# Patient Record
Sex: MALE | Race: BLACK OR AFRICAN AMERICAN | URBAN - METROPOLITAN AREA
[De-identification: names, ages, dates, MRNs, and addresses within clinical notes are randomized per-mention and may not be internally consistent; named-entity substitution may affect disease eponyms.]

---

## 2023-03-16 ENCOUNTER — APPOINTMENT (OUTPATIENT)
Dept: GENERAL RADIOLOGY | Age: 33
End: 2023-03-16
Payer: COMMERCIAL

## 2023-03-16 ENCOUNTER — HOSPITAL ENCOUNTER (EMERGENCY)
Age: 33
Discharge: HOME | End: 2023-03-16
Attending: STUDENT IN AN ORGANIZED HEALTH CARE EDUCATION/TRAINING PROGRAM
Payer: COMMERCIAL

## 2023-03-16 ENCOUNTER — APPOINTMENT (OUTPATIENT)
Dept: CT IMAGING | Age: 33
End: 2023-03-16
Payer: COMMERCIAL

## 2023-03-16 VITALS
HEIGHT: 71 IN | DIASTOLIC BLOOD PRESSURE: 111 MMHG | OXYGEN SATURATION: 97 % | RESPIRATION RATE: 25 BRPM | BODY MASS INDEX: 44.1 KG/M2 | SYSTOLIC BLOOD PRESSURE: 155 MMHG | WEIGHT: 315 LBS | HEART RATE: 73 BPM | TEMPERATURE: 98.2 F

## 2023-03-16 DIAGNOSIS — R10.11 ABDOMINAL PAIN, RIGHT UPPER QUADRANT: Primary | ICD-10-CM

## 2023-03-16 DIAGNOSIS — M54.6 PAIN IN THORACIC SPINE: ICD-10-CM

## 2023-03-16 LAB
ALBUMIN SERPL-MCNC: 4.3 G/DL (ref 3.4–5)
ALP SERPL-CCNC: 76 U/L (ref 40–129)
ALT SERPL-CCNC: 20 U/L (ref 10–40)
ANION GAP SERPL CALCULATED.3IONS-SCNC: 11 MMOL/L (ref 3–16)
AST SERPL-CCNC: 21 U/L (ref 15–37)
BASOPHILS # BLD: 0.1 K/UL (ref 0–0.2)
BASOPHILS NFR BLD: 1.1 %
BILIRUB DIRECT SERPL-MCNC: <0.2 MG/DL (ref 0–0.3)
BILIRUB INDIRECT SERPL-MCNC: NORMAL MG/DL (ref 0–1)
BILIRUB SERPL-MCNC: 0.3 MG/DL (ref 0–1)
BILIRUB UR QL STRIP.AUTO: NEGATIVE
BUN SERPL-MCNC: 23 MG/DL (ref 7–20)
CALCIUM SERPL-MCNC: 9.3 MG/DL (ref 8.3–10.6)
CHLORIDE SERPL-SCNC: 102 MMOL/L (ref 99–110)
CLARITY UR: CLEAR
CO2 SERPL-SCNC: 30 MMOL/L (ref 21–32)
COLOR UR: YELLOW
CREAT SERPL-MCNC: 1 MG/DL (ref 0.9–1.3)
D DIMER: 0.51 UG/ML FEU (ref 0–0.6)
DEPRECATED RDW RBC AUTO: 15 % (ref 12.4–15.4)
EKG ATRIAL RATE: 71 BPM
EKG DIAGNOSIS: NORMAL
EKG P AXIS: 33 DEGREES
EKG P-R INTERVAL: 168 MS
EKG Q-T INTERVAL: 390 MS
EKG QRS DURATION: 100 MS
EKG QTC CALCULATION (BAZETT): 423 MS
EKG R AXIS: -18 DEGREES
EKG T AXIS: 78 DEGREES
EKG VENTRICULAR RATE: 71 BPM
EOSINOPHIL # BLD: 0.1 K/UL (ref 0–0.6)
EOSINOPHIL NFR BLD: 1.2 %
GFR SERPLBLD CREATININE-BSD FMLA CKD-EPI: >60 ML/MIN/{1.73_M2}
GLUCOSE SERPL-MCNC: 106 MG/DL (ref 70–99)
GLUCOSE UR STRIP.AUTO-MCNC: NEGATIVE MG/DL
HCT VFR BLD AUTO: 39.1 % (ref 40.5–52.5)
HGB BLD-MCNC: 13.1 G/DL (ref 13.5–17.5)
HGB UR QL STRIP.AUTO: NEGATIVE
KETONES UR STRIP.AUTO-MCNC: NEGATIVE MG/DL
LEUKOCYTE ESTERASE UR QL STRIP.AUTO: NEGATIVE
LIPASE SERPL-CCNC: 97 U/L (ref 13–60)
LYMPHOCYTES # BLD: 1.9 K/UL (ref 1–5.1)
LYMPHOCYTES NFR BLD: 34.2 %
MAGNESIUM SERPL-MCNC: 1.9 MG/DL (ref 1.8–2.4)
MCH RBC QN AUTO: 29.9 PG (ref 26–34)
MCHC RBC AUTO-ENTMCNC: 33.5 G/DL (ref 31–36)
MCV RBC AUTO: 89.2 FL (ref 80–100)
MONOCYTES # BLD: 0.5 K/UL (ref 0–1.3)
MONOCYTES NFR BLD: 8.6 %
NEUTROPHILS # BLD: 3.1 K/UL (ref 1.7–7.7)
NEUTROPHILS NFR BLD: 54.9 %
NITRITE UR QL STRIP.AUTO: NEGATIVE
PH UR STRIP.AUTO: 7 [PH] (ref 5–8)
PLATELET # BLD AUTO: 223 K/UL (ref 135–450)
PMV BLD AUTO: 9.6 FL (ref 5–10.5)
POTASSIUM SERPL-SCNC: 3.2 MMOL/L (ref 3.5–5.1)
PROT SERPL-MCNC: 7.1 G/DL (ref 6.4–8.2)
PROT UR STRIP.AUTO-MCNC: ABNORMAL MG/DL
RBC # BLD AUTO: 4.39 M/UL (ref 4.2–5.9)
RBC #/AREA URNS HPF: NORMAL /HPF (ref 0–4)
SODIUM SERPL-SCNC: 143 MMOL/L (ref 136–145)
SP GR UR STRIP.AUTO: 1.02 (ref 1–1.03)
TROPONIN T SERPL-MCNC: <0.01 NG/ML
UA COMPLETE W REFLEX CULTURE PNL UR: ABNORMAL
UA DIPSTICK W REFLEX MICRO PNL UR: YES
URN SPEC COLLECT METH UR: ABNORMAL
UROBILINOGEN UR STRIP-ACNC: 0.2 E.U./DL
WBC # BLD AUTO: 5.7 K/UL (ref 4–11)
WBC #/AREA URNS HPF: NORMAL /HPF (ref 0–5)

## 2023-03-16 PROCEDURE — 85025 COMPLETE CBC W/AUTO DIFF WBC: CPT

## 2023-03-16 PROCEDURE — 83690 ASSAY OF LIPASE: CPT

## 2023-03-16 PROCEDURE — 71045 X-RAY EXAM CHEST 1 VIEW: CPT

## 2023-03-16 PROCEDURE — 71275 CT ANGIOGRAPHY CHEST: CPT

## 2023-03-16 PROCEDURE — 80076 HEPATIC FUNCTION PANEL: CPT

## 2023-03-16 PROCEDURE — 6370000000 HC RX 637 (ALT 250 FOR IP): Performed by: STUDENT IN AN ORGANIZED HEALTH CARE EDUCATION/TRAINING PROGRAM

## 2023-03-16 PROCEDURE — 84484 ASSAY OF TROPONIN QUANT: CPT

## 2023-03-16 PROCEDURE — 80048 BASIC METABOLIC PNL TOTAL CA: CPT

## 2023-03-16 PROCEDURE — 85379 FIBRIN DEGRADATION QUANT: CPT

## 2023-03-16 PROCEDURE — 83735 ASSAY OF MAGNESIUM: CPT

## 2023-03-16 PROCEDURE — 6360000004 HC RX CONTRAST MEDICATION: Performed by: STUDENT IN AN ORGANIZED HEALTH CARE EDUCATION/TRAINING PROGRAM

## 2023-03-16 PROCEDURE — 74177 CT ABD & PELVIS W/CONTRAST: CPT

## 2023-03-16 PROCEDURE — 99285 EMERGENCY DEPT VISIT HI MDM: CPT

## 2023-03-16 PROCEDURE — 81001 URINALYSIS AUTO W/SCOPE: CPT

## 2023-03-16 PROCEDURE — 93005 ELECTROCARDIOGRAM TRACING: CPT | Performed by: STUDENT IN AN ORGANIZED HEALTH CARE EDUCATION/TRAINING PROGRAM

## 2023-03-16 RX ORDER — METHOCARBAMOL 500 MG/1
750 TABLET, FILM COATED ORAL 4 TIMES DAILY
Status: DISCONTINUED | OUTPATIENT
Start: 2023-03-16 | End: 2023-03-16 | Stop reason: HOSPADM

## 2023-03-16 RX ORDER — LIDOCAINE 4 G/G
1 PATCH TOPICAL DAILY
Status: DISCONTINUED | OUTPATIENT
Start: 2023-03-16 | End: 2023-03-16 | Stop reason: HOSPADM

## 2023-03-16 RX ORDER — METHOCARBAMOL 500 MG/1
500 TABLET, FILM COATED ORAL EVERY 8 HOURS PRN
Qty: 15 TABLET | Refills: 0 | Status: SHIPPED | OUTPATIENT
Start: 2023-03-16 | End: 2023-03-21

## 2023-03-16 RX ORDER — IBUPROFEN 400 MG/1
400 TABLET ORAL ONCE
Status: COMPLETED | OUTPATIENT
Start: 2023-03-16 | End: 2023-03-16

## 2023-03-16 RX ORDER — LIDOCAINE 4 G/G
1 PATCH TOPICAL EVERY 24 HOURS
Qty: 30 PATCH | Refills: 0 | Status: SHIPPED | OUTPATIENT
Start: 2023-03-16 | End: 2023-04-15

## 2023-03-16 RX ADMIN — IOPAMIDOL 75 ML: 755 INJECTION, SOLUTION INTRAVENOUS at 10:44

## 2023-03-16 RX ADMIN — METHOCARBAMOL 750 MG: 500 TABLET ORAL at 12:50

## 2023-03-16 RX ADMIN — IBUPROFEN 400 MG: 400 TABLET ORAL at 12:49

## 2023-03-16 ASSESSMENT — PAIN DESCRIPTION - PAIN TYPE: TYPE: ACUTE PAIN

## 2023-03-16 ASSESSMENT — PAIN - FUNCTIONAL ASSESSMENT: PAIN_FUNCTIONAL_ASSESSMENT: 0-10

## 2023-03-16 ASSESSMENT — PAIN SCALES - GENERAL
PAINLEVEL_OUTOF10: 7
PAINLEVEL_OUTOF10: 7

## 2023-03-16 ASSESSMENT — PAIN DESCRIPTION - ORIENTATION: ORIENTATION: MID;RIGHT

## 2023-03-16 ASSESSMENT — PAIN DESCRIPTION - FREQUENCY: FREQUENCY: CONTINUOUS

## 2023-03-16 ASSESSMENT — PAIN DESCRIPTION - DESCRIPTORS: DESCRIPTORS: CRAMPING;PRESSURE

## 2023-03-16 ASSESSMENT — PAIN DESCRIPTION - LOCATION: LOCATION: ABDOMEN;FLANK

## 2023-03-16 ASSESSMENT — PAIN DESCRIPTION - ONSET: ONSET: GRADUAL

## 2023-03-16 NOTE — ED PROVIDER NOTES
[address] ENCOUNTER          ATTENDING PHYSICIAN NOTE       Date of evaluation: 3/16/2023    Chief Complaint     Abdominal Pain and Flank Pain (C/o right flank pain and mid upper abd pain started 2 days ago getting worse )      History of Present Illness     Kevin Sanford is a 26 y.o. male who presents with RUQ/R posterior thorax pain    Patient reports that beginning last night he had right paraspinal back pain or flank pain. Pt denies a prolonged period of steroid use, personal history of cancer, or IV drug use. They also deny any trauma. They deny any numbness, weakness, or paresthesias, as well as deny any urinary retention, urinary incontinence, stool incontinence, or saddle anesthesia. This was mild to moderate in severity and aching in quality. It was optically worse with movement. This morning began having right upper quadrant epigastric pain. This was moderate to severe in severity, waxing and waning in course, and sharper in quality. It is worse with palpation. Is not optically worse with movement. Because of the persistent back pain and the presence of the new epigastric and right upper quadrant abdominal pain he did present for evaluation today. He has not had any recent fevers, nausea, vomiting. He has no history of blood clots, DVT or PE. He denies any history of leg pain or swelling recently. He does however live in a [place] and travel extended distances with some regularity. He has no significant shortness of breath or worsening of the pain with deep breathing. He has had normal bowel movements recently, including last night. Specifically he denies any blood, or melena. He has had no urinary symptoms recently. He has no history of prior abdominal surgery.     PMHx: Obesity, hypertension  SH: Lives in a [place], non-smoker, does occasionally use alcohol      ASSESSMENT / PLAN  (MEDICAL DECISION MAKING)     INITIAL VITALS: BP: (!) 177/117, Temp: 98.2 °F (36.8 °C), Heart Rate: 81, Resp: 18, SpO2: 99 %      Anny Sosa is a 26 y.o. male with 2 areas of pain: 1 in the posterior thoracic region on the right and 1 in the right upper quadrant epigastric region anteriorly. Given location of the pain I did consider the possibility pulmonary embolism, particular given the patient's extended [place] driving. Chest x-ray was negative for pneumonia. D-dimer was elevated so we proceeded to CTPA. This was negative pulmonary embolism or any other intrathoracic acute pathology. The anterior right upper quadrant pain was associated with tenderness with palpation. I did feel that this required further investigation. CT imaging was obtained and demonstrated no acute intra-abdominal abnormality. CBC with no leukocytosis, making systemic infection somewhat less likely. There is no significant new anemia. Basic metabolic panel without evidence of acute kidney injury or significant electrolyte derangement. Hepatic function panel unremarkable making acute hepatitis or biliary pathology less likely. Lipase is mildly, nonspecifically elevated but is below the cutoff of 3 times the upper limit of normal and without imaging evidence of pancreatitis or an examination suggestive of such, I find the diagnosis of pancreatitis to be unlikely. Urinalysis unremarkable and free of evidence of acute urinary tract infection. No hematuria noted, either. Troponin not elevated making ACS less likely. I discussed the option to repeat the troponin but the patient declined repeat testing and given the duration of symptoms I do feel this is reasonable. BNP not elevated making acute exacerbation of heart failure less likely. Lactate not elevated making systemic infection and hypoperfusion somewhat less likely. At this point the cause of the patient's pain is unclear but may relate to moderate muscular strain.   We will trial Robaxin and lidocaine patch and prescribe these for the patient as well. He understands reasons to return the emergency room and will do so. Medical Decision Making  Amount and/or Complexity of Data Reviewed  Independent Historian: spouse     Details: spouse-details of timecourse of symtpoms  Labs: ordered. Decision-making details documented in ED Course. Radiology: ordered. Decision-making details documented in ED Course. ECG/medicine tests: ordered and independent interpretation performed. Decision-making details documented in ED Course. Details: EKG without evidence of ischemia    Risk  OTC drugs. Prescription drug management. Clinical Impression     1. Abdominal pain, right upper quadrant    2. Thoracic Myalgia        Disposition     PATIENT REFERRED TO:  Provider Unknown, STANLEY    Schedule an appointment as soon as possible for a visit in 3 days  Follow up within 3 days, Return to ED sooner if symptoms worsen    DISCHARGE MEDICATIONS:  New Prescriptions    LIDOCAINE 4 % EXTERNAL PATCH    Place 1 patch onto the skin every 24 hours Place 1 patch onto the skin daily 12 hours on, 12 hours off. METHOCARBAMOL (ROBAXIN) 500 MG TABLET    Take 1 tablet by mouth every 8 hours as needed (Muscle spasms)       DISPOSITION Decision To Discharge 03/16/2023 12:26:54 PM        Diagnostic Results and Other Data       RADIOLOGY:  CT ABDOMEN PELVIS W IV CONTRAST Additional Contrast? None   Final Result   1. Hepatic steatosis   2. No acute intra-abdominopelvic abnormality. 3.  Small umbilical hernia containing fat      CTA Chest W/ Contrast R/O PE   Final Result      1. No evidence of acute or chronic pulmonary embolus. 2. No active airspace disease         XR CHEST PORTABLE   Final Result   1. No acute disease.           LABS:   Results for orders placed or performed during the hospital encounter of 03/16/23   BMP w/ Reflex to MG   Result Value Ref Range    Sodium 143 136 - 145 mmol/L    Potassium reflex Magnesium 3.2 (L) 3.5 - 5.1 mmol/L    Chloride 102 99 - 110 mmol/L    CO2 30 21 - 32 mmol/L    Anion Gap 11 3 - 16    Glucose 106 (H) 70 - 99 mg/dL    BUN 23 (H) 7 - 20 mg/dL    Creatinine 1.0 0.9 - 1.3 mg/dL    Est, Glom Filt Rate >60 >60    Calcium 9.3 8.3 - 10.6 mg/dL   CBC with Auto Differential   Result Value Ref Range    WBC 5.7 4.0 - 11.0 K/uL    RBC 4.39 4.20 - 5.90 M/uL    Hemoglobin 13.1 (L) 13.5 - 17.5 g/dL    Hematocrit 39.1 (L) 40.5 - 52.5 %    MCV 89.2 80.0 - 100.0 fL    MCH 29.9 26.0 - 34.0 pg    MCHC 33.5 31.0 - 36.0 g/dL    RDW 15.0 12.4 - 15.4 %    Platelets 729 031 - 129 K/uL    MPV 9.6 5.0 - 10.5 fL    Neutrophils % 54.9 %    Lymphocytes % 34.2 %    Monocytes % 8.6 %    Eosinophils % 1.2 %    Basophils % 1.1 %    Neutrophils Absolute 3.1 1.7 - 7.7 K/uL    Lymphocytes Absolute 1.9 1.0 - 5.1 K/uL    Monocytes Absolute 0.5 0.0 - 1.3 K/uL    Eosinophils Absolute 0.1 0.0 - 0.6 K/uL    Basophils Absolute 0.1 0.0 - 0.2 K/uL   Hepatic Function Panel   Result Value Ref Range    Total Protein 7.1 6.4 - 8.2 g/dL    Albumin 4.3 3.4 - 5.0 g/dL    Alkaline Phosphatase 76 40 - 129 U/L    ALT 20 10 - 40 U/L    AST 21 15 - 37 U/L    Total Bilirubin 0.3 0.0 - 1.0 mg/dL    Bilirubin, Direct <0.2 0.0 - 0.3 mg/dL    Bilirubin, Indirect see below 0.0 - 1.0 mg/dL   Lipase   Result Value Ref Range    Lipase 97.0 (H) 13.0 - 60.0 U/L   Troponin   Result Value Ref Range    Troponin <0.01 <0.01 ng/mL   Urinalysis with Reflex to Culture    Specimen: Urine   Result Value Ref Range    Color, UA Yellow Straw/Yellow    Clarity, UA Clear Clear    Glucose, Ur Negative Negative mg/dL    Bilirubin Urine Negative Negative    Ketones, Urine Negative Negative mg/dL    Specific Gravity, UA 1.020 1.005 - 1.030    Blood, Urine Negative Negative    pH, UA 7.0 5.0 - 8.0    Protein, UA TRACE (A) Negative mg/dL    Urobilinogen, Urine 0.2 <2.0 E.U./dL    Nitrite, Urine Negative Negative    Leukocyte Esterase, Urine Negative Negative    Microscopic Examination YES Urine Type NotGiven     Urine Reflex to Culture Not Indicated    D-Dimer, Quantitative   Result Value Ref Range    D-Dimer, Quant 0.51 0.00 - 0.60 ug/mL FEU   Magnesium   Result Value Ref Range    Magnesium 1.90 1.80 - 2.40 mg/dL   Microscopic Urinalysis   Result Value Ref Range    WBC, UA 0-2 0 - 5 /HPF    RBC, UA 0-2 0 - 4 /HPF     EKG   Indication chest pain     EKG Interpretation     Interpreted by me (emergency department physician)     Rhythm: normal sinus   Rate: 71  Axis: normal (-18)  Ectopy: none  Conduction: normal  ST Segments: no significant elevation or depression  T Waves: nonspecific pattern  Q Waves: nonspecific pattern     Clinical Impression:   Normal sinus rhythm. This is a non-specific EKG with no prior available for comparison  There is no evidence of significant interval prolongation. There is no evidence of acute ischemia. [facility]      ED BEDSIDE ULTRASOUND:  No results found. MOST RECENT VITALS:  BP: (!) 156/106,Temp: 98.2 °F (36.8 °C), Heart Rate: 91, Resp: 28, SpO2: 99 %     Procedures     none    ED Course     Nursing Notes, Past Medical Hx, Past Surgical Hx, Social Hx,Allergies, and Family Hx were reviewed. The patient was given the following medications:  Orders Placed This Encounter   Medications    iopamidol (ISOVUE-370) 76 % injection 75 mL    lidocaine 4 % external patch 1 patch    methocarbamol (ROBAXIN) tablet 750 mg    ibuprofen (ADVIL;MOTRIN) tablet 400 mg    methocarbamol (ROBAXIN) 500 MG tablet     Sig: Take 1 tablet by mouth every 8 hours as needed (Muscle spasms)     Dispense:  15 tablet     Refill:  0    lidocaine 4 % external patch     Sig: Place 1 patch onto the skin every 24 hours Place 1 patch onto the skin daily 12 hours on, 12 hours off. Dispense:  30 patch     Refill:  0       CONSULTS:  None      Review of Systems       ROS:   Positive as per HPI.   Negative for:    -Constitutional: fevers, chills    -Eyes:   eye pain, eye discharge -Ears/Nose/Throat: Ear pain, ear discharge    -Cardiovascular: cyanosis    -Respiratory:  Cough     -Gastrointestinal:  nausea, vomiting, melena, hematochezia    -Genitourinary: hematuria, dysuria, urinary frequency    -Neurological: numbness or weakness    -Skin:   Rash, pruritis,     -Hematologic: easy bleeding, easy bruising    -Musculoskeletal:  joint swelling, joint redness    Past Medical, Surgical, Family, and Social History     He has no past medical history on file. He has no past surgical history on file. His family history is not on file. He reports that he has never smoked. He does not have any smokeless tobacco history on file. He reports current alcohol use. Medications     Previous Medications    No medications on file       Allergies     He has No Known Allergies. Physical Exam     INITIAL VITALS: BP: (!) 177/117, Temp: 98.2 °F (36.8 °C), Heart Rate: 81, Resp: 18, SpO2: 99 %     General:  Well appearing. No acute distress. Non-toxic appearing     Eyes:  Pupils equally round, reactive, brisk. No discharge from eyes. ENT:  No discharge from nose. OP clear. Neck:  Supple. Nontender. Pulmonary:   Non-labored breathing. Breath sounds clear bilaterally. Cardiac:  Regular rate and rhythm. No murmurs. Abdomen:  Soft. Mild TTP in RUQ>epigastrium. No rebound, guarding, or other area of TTP. Non-distended. No masses. Musculoskeletal:  No long bone deformity. No ankle or wrist deformity. Vascular:  Extremities warm and perfused. Skin:  No rash. Warm. Neuro: Alert and oriented x 3. CN II-XII grossly intact. Speech and mentation normal.    No pronator drift. LE at least anti-gravity for hip flexion x5 secs b/l.  5/5 strength in all 4 extremities by finger  and ankle dorsi/plantar flexion. Sensation grossly intact to light touch. Gait narrow and stable, not ataxic    Extremities:  No peripheral edema. LE symmetric.                Garrison Canales MD  03/16/23 [id number]

## 2023-03-16 NOTE — DISCHARGE INSTRUCTIONS
We saw you in the hospital for abdominal pain and back pain    You were treated with robaxin, motrin, and lidocaine patch. You need to take a pill called robaxin when you go home, up to 3 times a day, as needed for muscle spasm. Over-the-counter medications: There are many great over the counter medications that can be used to manage your pain. The following are some recommendations of things you can do to manage your short-term pain:    1. Take ibuprofen (Motrin) 600 mg (three over-the-counter) tablets every six hours OR naproxen (Aleve) 220 mg (two over the counter tablets) twice daily as needed for pain and swelling. Ibuprofen and naproxen are anti-inflammatory medications, it manages pain by attacking the cause of the pain, inflammation. These medications works best if you take it as directed, every six hours for the first few days, then cutting back to using it only as needed for pain. 2. If you continue to experience pain while on these medications, consider adding Tylenol (acetaminophen). You may take 650-1000 mg (two regular or extra strength tablets) three times a day. DO NOT TAKE MORE THAN 3000 mg (Six extra-strength tablets) PER DAY. If you routinely consume more than three alcoholic beverages a day, do not take Tylenol before consulting your family physician. You need to see your regular doctor in 2-3 days to be checked. You should return to the emergency department if your symptoms worsen or do not resolve. In addition, return if:  - You have a fever (greater than 101 degrees)  - You have chest pain, shortness of breath, abdominal pain, or uncontrollable vomiting  - You are unable to eat or drink  - You pass out  - You have difficulty moving your arms or legs or any new numbness or weakness or paresthesias  - You have difficulty speaking or slurred speech  - Or you have any concern that you feel needs acute physician evaluation. You should find a regular doctor to see.  You can look at the list below for regular doctors that can see you. Talk to our  if you have more questions. [facility] ([facility]) locations:  --[facility] ([facility]) -- [address]  [address], [address]  Get Directions  Tel: [phone]    --[facility] ([facility]) -- [facility] [address]  [address]   [address] [facility], [address]  Get Directions  Tel: Joseluis A SLIDING FEE  [facility]       Address-Location  Phone Number-Call [address] [facility] 100[address] [facility] [address] [phone]   For Homeless population only   [address] [phone]  [address] -[address] [phone]   [address] [facility]   [phone] [facility]   [address]  12[address] [facility] 102 [facility] [employer]  [facility] [address] [facility] [address] [facility] [address]  [phone] [phone]     [address] [facility] [address]  [place] [phone]   The [address]  [address] [facility]  [address]  [facility]  [address] [place]  [place]  [address]   [address]  [address] [place], [place] [address]way [address] [facility] [address]  [phone]  [phone]   [address] [facility]   100 [facility]. [facility] 139.  Call Center for both locations    [address] [phone]   [facility] [address]   [facility] Denisse Siddiqui  [address]  [phone]